# Patient Record
Sex: MALE | Race: WHITE | NOT HISPANIC OR LATINO | Employment: OTHER | URBAN - METROPOLITAN AREA
[De-identification: names, ages, dates, MRNs, and addresses within clinical notes are randomized per-mention and may not be internally consistent; named-entity substitution may affect disease eponyms.]

---

## 2023-03-13 ENCOUNTER — TELEPHONE (OUTPATIENT)
Dept: PRIMARY CARE | Facility: CLINIC | Age: 78
End: 2023-03-13

## 2023-03-13 NOTE — TELEPHONE ENCOUNTER
Patient would like a refill on his symbicort, atorvastation, clopidogrel, ezetimibe, metropolol, vitamin D, and magnesium. He is currently in [place] and needs it sent to the [facility].

## 2023-03-16 DIAGNOSIS — E78.2 MIXED HYPERLIPIDEMIA: ICD-10-CM

## 2023-03-16 DIAGNOSIS — E83.42 HYPOMAGNESEMIA: ICD-10-CM

## 2023-03-16 DIAGNOSIS — J45.909 ASTHMA, UNSPECIFIED ASTHMA SEVERITY, UNSPECIFIED WHETHER COMPLICATED, UNSPECIFIED WHETHER PERSISTENT (HHS-HCC): ICD-10-CM

## 2023-03-16 DIAGNOSIS — E55.9 HYPOVITAMINOSIS D: ICD-10-CM

## 2023-03-16 DIAGNOSIS — J30.89 SEASONAL ALLERGIC RHINITIS DUE TO OTHER ALLERGIC TRIGGER: ICD-10-CM

## 2023-03-16 DIAGNOSIS — I25.10 CORONARY ARTERY DISEASE, UNSPECIFIED VESSEL OR LESION TYPE, UNSPECIFIED WHETHER ANGINA PRESENT, UNSPECIFIED WHETHER NATIVE OR TRANSPLANTED HEART: Primary | ICD-10-CM

## 2023-03-16 PROBLEM — E78.5 HYPERLIPIDEMIA: Status: ACTIVE | Noted: 2023-03-16

## 2023-03-16 PROBLEM — Z85.46 HISTORY OF PROSTATE CANCER: Status: ACTIVE | Noted: 2023-03-16

## 2023-03-16 PROBLEM — I10 BENIGN ESSENTIAL HYPERTENSION: Status: ACTIVE | Noted: 2023-03-16

## 2023-03-16 PROBLEM — K40.90 INGUINAL HERNIA: Status: ACTIVE | Noted: 2023-03-16

## 2023-03-16 PROBLEM — Z98.61 S/P PTCA (PERCUTANEOUS TRANSLUMINAL CORONARY ANGIOPLASTY): Status: ACTIVE | Noted: 2023-03-16

## 2023-03-16 PROBLEM — I63.9 CVA (CEREBRAL VASCULAR ACCIDENT) (MULTI): Status: ACTIVE | Noted: 2023-03-16

## 2023-03-16 PROBLEM — J30.9 ALLERGIC RHINITIS: Status: ACTIVE | Noted: 2023-03-16

## 2023-03-16 RX ORDER — ATORVASTATIN CALCIUM 80 MG/1
80 TABLET, FILM COATED ORAL DAILY
COMMUNITY
Start: 2022-08-18 | End: 2023-03-16 | Stop reason: SDUPTHER

## 2023-03-16 RX ORDER — MAGNESIUM 250 MG
1 TABLET ORAL DAILY
COMMUNITY
End: 2023-03-16 | Stop reason: SDUPTHER

## 2023-03-16 RX ORDER — EZETIMIBE 10 MG/1
10 TABLET ORAL NIGHTLY
Qty: 90 TABLET | Refills: 1 | Status: SHIPPED | OUTPATIENT
Start: 2023-03-16 | End: 2024-04-26

## 2023-03-16 RX ORDER — ATORVASTATIN CALCIUM 80 MG/1
80 TABLET, FILM COATED ORAL DAILY
Qty: 90 TABLET | Refills: 1 | Status: SHIPPED | OUTPATIENT
Start: 2023-03-16 | End: 2024-04-26

## 2023-03-16 RX ORDER — EZETIMIBE 10 MG/1
10 TABLET ORAL NIGHTLY
COMMUNITY
End: 2023-03-16 | Stop reason: SDUPTHER

## 2023-03-16 RX ORDER — IPRATROPIUM BROMIDE 21 UG/1
2 SPRAY, METERED NASAL 2 TIMES DAILY
COMMUNITY
Start: 2022-06-07 | End: 2024-05-01 | Stop reason: ALTCHOICE

## 2023-03-16 RX ORDER — ERGOCALCIFEROL 1.25 MG/1
1.25 CAPSULE ORAL
Qty: 12 CAPSULE | Refills: 0 | Status: SHIPPED | OUTPATIENT
Start: 2023-03-16

## 2023-03-16 RX ORDER — MONTELUKAST SODIUM 10 MG/1
10 TABLET ORAL NIGHTLY
COMMUNITY

## 2023-03-16 RX ORDER — CLOPIDOGREL BISULFATE 75 MG/1
1 TABLET ORAL EVERY OTHER DAY
COMMUNITY
Start: 2022-06-09 | End: 2023-03-16 | Stop reason: SDUPTHER

## 2023-03-16 RX ORDER — METOPROLOL SUCCINATE 25 MG/1
25 TABLET, EXTENDED RELEASE ORAL DAILY
COMMUNITY
End: 2023-03-16 | Stop reason: SDUPTHER

## 2023-03-16 RX ORDER — LORATADINE 10 MG/1
1 TABLET ORAL DAILY
COMMUNITY
Start: 2022-08-16

## 2023-03-16 RX ORDER — VALSARTAN AND HYDROCHLOROTHIAZIDE 160; 12.5 MG/1; MG/1
1 TABLET, FILM COATED ORAL DAILY
COMMUNITY
Start: 2022-08-18

## 2023-03-16 RX ORDER — MAGNESIUM 250 MG
1 TABLET ORAL 2 TIMES DAILY
COMMUNITY
Start: 2022-10-05 | End: 2023-03-16 | Stop reason: SDUPTHER

## 2023-03-16 RX ORDER — FLUTICASONE PROPIONATE 50 MCG
2 SPRAY, SUSPENSION (ML) NASAL
COMMUNITY
Start: 2022-08-16 | End: 2024-05-01 | Stop reason: ALTCHOICE

## 2023-03-16 RX ORDER — NITROGLYCERIN 0.4 MG/1
0.4 TABLET SUBLINGUAL EVERY 5 MIN PRN
COMMUNITY

## 2023-03-16 RX ORDER — ERGOCALCIFEROL 1.25 MG/1
1.25 CAPSULE ORAL 2 TIMES WEEKLY
COMMUNITY
Start: 2022-10-17 | End: 2023-03-16 | Stop reason: SDUPTHER

## 2023-03-16 RX ORDER — CLOPIDOGREL BISULFATE 75 MG/1
75 TABLET ORAL EVERY OTHER DAY
Qty: 90 TABLET | Refills: 1 | Status: SHIPPED | OUTPATIENT
Start: 2023-03-16

## 2023-03-16 RX ORDER — ASPIRIN 81 MG/1
1 TABLET ORAL DAILY
COMMUNITY

## 2023-03-16 RX ORDER — BUDESONIDE AND FORMOTEROL FUMARATE DIHYDRATE 160; 4.5 UG/1; UG/1
2 AEROSOL RESPIRATORY (INHALATION) 2 TIMES DAILY
COMMUNITY
End: 2023-03-16 | Stop reason: SDUPTHER

## 2023-03-16 RX ORDER — MAGNESIUM 250 MG
1 TABLET ORAL 2 TIMES DAILY
Qty: 180 TABLET | Refills: 1 | Status: SHIPPED | OUTPATIENT
Start: 2023-03-16

## 2023-03-16 RX ORDER — ALBUTEROL SULFATE 90 UG/1
2 AEROSOL, METERED RESPIRATORY (INHALATION) 4 TIMES DAILY PRN
COMMUNITY
Start: 2022-05-25 | End: 2023-04-25 | Stop reason: SDUPTHER

## 2023-03-16 RX ORDER — METOPROLOL SUCCINATE 25 MG/1
25 TABLET, EXTENDED RELEASE ORAL DAILY
Qty: 90 TABLET | Refills: 1 | Status: SHIPPED | OUTPATIENT
Start: 2023-03-16 | End: 2024-05-01 | Stop reason: ALTCHOICE

## 2023-03-16 RX ORDER — BUDESONIDE AND FORMOTEROL FUMARATE DIHYDRATE 160; 4.5 UG/1; UG/1
2 AEROSOL RESPIRATORY (INHALATION) 2 TIMES DAILY
Qty: 10.2 G | Refills: 3 | Status: SHIPPED | OUTPATIENT
Start: 2023-03-16

## 2023-03-16 RX ORDER — OMEPRAZOLE 40 MG/1
40 CAPSULE, DELAYED RELEASE ORAL
COMMUNITY
Start: 2022-09-01

## 2023-04-25 ENCOUNTER — TELEPHONE (OUTPATIENT)
Dept: PRIMARY CARE | Facility: CLINIC | Age: 78
End: 2023-04-25

## 2023-04-25 DIAGNOSIS — J45.20 MILD INTERMITTENT ASTHMA, UNSPECIFIED WHETHER COMPLICATED (HHS-HCC): ICD-10-CM

## 2023-04-25 RX ORDER — ALBUTEROL SULFATE 90 UG/1
2 AEROSOL, METERED RESPIRATORY (INHALATION) 4 TIMES DAILY PRN
Qty: 18 G | Refills: 1 | Status: SHIPPED | OUTPATIENT
Start: 2023-04-25

## 2023-04-25 NOTE — TELEPHONE ENCOUNTER
Rx Refill Request Telephone Encounter    Name:  Lang Powell  :  [id number]  Medication Name:  albuterol inhaler  Dose : 90MCG  Route : inhalation  Frequency : as needed  Quantity : 1  Directions: inhale 2 puffs 4 times a day as needed for wheezing or shortness of breath   Specific Pharmacy location:  [facility]  Date of last appointment: 2022  Date of next appointment:  N/A  Best number to reach patient:  [phone]

## 2023-04-27 NOTE — TELEPHONE ENCOUNTER
Disregard this refill, patient called today and stated he asked for the wrong inhaler. He actually needs his symbicort to be refilled instead.

## 2024-02-08 PROBLEM — G45.9 TIA (TRANSIENT ISCHEMIC ATTACK): Status: ACTIVE | Noted: 2024-02-08

## 2024-02-08 PROBLEM — R53.83 FATIGUE: Status: ACTIVE | Noted: 2024-02-08

## 2024-04-26 DIAGNOSIS — E78.2 MIXED HYPERLIPIDEMIA: ICD-10-CM

## 2024-04-26 RX ORDER — EZETIMIBE 10 MG/1
10 TABLET ORAL NIGHTLY
Qty: 90 TABLET | Refills: 3 | Status: SHIPPED | OUTPATIENT
Start: 2024-04-26 | End: 2025-04-26

## 2024-04-26 RX ORDER — ATORVASTATIN CALCIUM 80 MG/1
80 TABLET, FILM COATED ORAL NIGHTLY
Qty: 90 TABLET | Refills: 3 | Status: SHIPPED | OUTPATIENT
Start: 2024-04-26 | End: 2025-04-26

## 2024-05-01 ENCOUNTER — OFFICE VISIT (OUTPATIENT)
Dept: CARDIOLOGY | Facility: CLINIC | Age: 79
End: 2024-05-01
Payer: MEDICARE

## 2024-05-01 VITALS
HEART RATE: 68 BPM | HEIGHT: 66 IN | WEIGHT: 180 LBS | BODY MASS INDEX: 28.93 KG/M2 | DIASTOLIC BLOOD PRESSURE: 70 MMHG | SYSTOLIC BLOOD PRESSURE: 130 MMHG

## 2024-05-01 DIAGNOSIS — I25.10 CORONARY ARTERY DISEASE INVOLVING NATIVE CORONARY ARTERY OF NATIVE HEART WITHOUT ANGINA PECTORIS: Primary | ICD-10-CM

## 2024-05-01 DIAGNOSIS — Z98.61 S/P PTCA (PERCUTANEOUS TRANSLUMINAL CORONARY ANGIOPLASTY): ICD-10-CM

## 2024-05-01 DIAGNOSIS — E66.3 OVERWEIGHT: ICD-10-CM

## 2024-05-01 DIAGNOSIS — I10 BENIGN ESSENTIAL HYPERTENSION: ICD-10-CM

## 2024-05-01 DIAGNOSIS — C61 PROSTATE CANCER METASTATIC TO INTRAABDOMINAL LYMPH NODE (MULTI): ICD-10-CM

## 2024-05-01 DIAGNOSIS — E78.2 MIXED HYPERLIPIDEMIA: ICD-10-CM

## 2024-05-01 DIAGNOSIS — C77.2 PROSTATE CANCER METASTATIC TO INTRAABDOMINAL LYMPH NODE (MULTI): ICD-10-CM

## 2024-05-01 PROBLEM — G45.9 TIA (TRANSIENT ISCHEMIC ATTACK): Status: RESOLVED | Noted: 2024-02-08 | Resolved: 2024-05-01

## 2024-05-01 PROCEDURE — 1036F TOBACCO NON-USER: CPT | Performed by: INTERNAL MEDICINE

## 2024-05-01 PROCEDURE — 3078F DIAST BP <80 MM HG: CPT | Performed by: INTERNAL MEDICINE

## 2024-05-01 PROCEDURE — 3075F SYST BP GE 130 - 139MM HG: CPT | Performed by: INTERNAL MEDICINE

## 2024-05-01 PROCEDURE — 1159F MED LIST DOCD IN RCRD: CPT | Performed by: INTERNAL MEDICINE

## 2024-05-01 PROCEDURE — 99214 OFFICE O/P EST MOD 30 MIN: CPT | Performed by: INTERNAL MEDICINE

## 2024-05-01 NOTE — LETTER
May 1, 2024     TERRY Morris  Office Address Unavailable  As Of 6/1/2023    Patient: Lang Powell   YOB: 1945   Date of Visit: 5/1/2024       Dear CHAD Zarate-CNP:    Thank you for referring Lang Powell to me for evaluation. Below are my notes for this consultation.  If you have questions, please do not hesitate to call me. I look forward to following your patient along with you.       Sincerely,     Jarvis Mccoy MD      CC: No Recipients  ______________________________________________________________________________________    Subjective   Lang Powell is a 78 y.o. male       Chief Complaint    Annual Exam          HPI   Patient is in the office for follow-up for coronary disease and previous PCI as noted below, in the interim he developed metastatic prostate cancer requiring chemotherapy, apparently the cancer spread to his lymph nodes in the abdomen but not to the bone.  He follows with oncology.  PSA down to less than 3 right now.  He feels great and denies any complaints at this time.  He has not had any lipid profile recently and we ordered 1.  Other labs were reviewed and discussed with the patient and they seem to be stable.  His weight is above target and was reminded to bring his weight further down.  He maintains active lifestyle.  He denies any angina palpitations orthopnea PND or lower extremity edema and his physical examination is only remarkable for overweight.    Assessment/recommendations:     1-single-vessel coronary disease status post angioplasty with drug-eluting stent to mid anterior descending artery. He will stay on aspirin and Plavix  He is compliant with measures to prevent recurrent CAD.  2-history of non-ST elevation myocardial infarction with preserved ejection fraction, this took place in June 2020. It has resolved with no consequences. Presently guideline directed medical therapy for chronic ischemic heart disease is in  "place  3-hypertension presently under control,   4-hyperlipidemia on high intensity statin with Lipitor 80 mg daily. Lipid profile is ordered,  5-significant overweight, with lifestyle modifications his weight is coming down nicely.  6-metastatic prostate cancer completed chemotherapy recently and presently in remission followed by oncology  7-intermittent asthma on medical therapy currently under control.  Review of Systems   All other systems reviewed and are negative.           Vitals:    05/01/24 0904   BP: 130/70   BP Location: Left arm   Patient Position: Sitting   Pulse: 68   Weight: 81.6 kg (180 lb)   Height: 1.676 m (5' 6\")        Objective   Physical Exam  Constitutional:       Appearance: Normal appearance.   HENT:      Nose: Nose normal.   Neck:      Vascular: No carotid bruit.   Cardiovascular:      Rate and Rhythm: Normal rate.      Pulses: Normal pulses.      Heart sounds: Normal heart sounds.   Pulmonary:      Effort: Pulmonary effort is normal.   Abdominal:      General: Bowel sounds are normal.      Palpations: Abdomen is soft.   Musculoskeletal:         General: Normal range of motion.      Cervical back: Normal range of motion.      Right lower leg: No edema.      Left lower leg: No edema.   Skin:     General: Skin is warm and dry.   Neurological:      General: No focal deficit present.      Mental Status: He is alert.   Psychiatric:         Mood and Affect: Mood normal.         Behavior: Behavior normal.         Thought Content: Thought content normal.         Judgment: Judgment normal.         Allergies  Ace inhibitors, Penicillins, and Sulfamethoxazole     Current Medications    Current Outpatient Medications:   •  albuterol 90 mcg/actuation inhaler, Inhale 2 puffs 4 times a day as needed for wheezing or shortness of breath., Disp: 18 g, Rfl: 1  •  aspirin 81 mg EC tablet, Take 1 tablet (81 mg) by mouth once daily., Disp: , Rfl:   •  atorvastatin (Lipitor) 80 mg tablet, Take 1 tablet (80 mg) " by mouth once daily at bedtime., Disp: 90 tablet, Rfl: 3  •  budesonide-formoteroL (Symbicort) 160-4.5 mcg/actuation inhaler, Inhale 2 puffs  in the morning and 2 puffs before bedtime., Disp: 10.2 g, Rfl: 3  •  clopidogrel (Plavix) 75 mg tablet, Take 1 tablet (75 mg) by mouth every other day., Disp: 90 tablet, Rfl: 1  •  darolutamide (Nubeqa) 300 mg tablet, TAKE 2 (600 MG) TABLETS ORALLY TWICE DAILY, Disp: 120 tablet, Rfl: 2  •  ergocalciferol (Vitamin D-2) 1.25 MG (61472 UT) capsule, Take 1 capsule (1,250 mcg) by mouth 1 (one) time per week., Disp: 12 capsule, Rfl: 0  •  ezetimibe (Zetia) 10 mg tablet, Take 1 tablet (10 mg) by mouth once daily at bedtime., Disp: 90 tablet, Rfl: 3  •  loratadine (Claritin) 10 mg tablet, Take 1 tablet (10 mg) by mouth once daily., Disp: , Rfl:   •  magnesium 250 mg tablet, Take 1 tablet (250 mg) by mouth in the morning and 1 tablet (250 mg) before bedtime., Disp: 180 tablet, Rfl: 1  •  montelukast (Singulair) 10 mg tablet, Take 1 tablet (10 mg) by mouth once daily at bedtime., Disp: , Rfl:   •  nitroglycerin (Nitrostat) 0.4 mg SL tablet, Place 1 tablet (0.4 mg) under the tongue every 5 minutes if needed for chest pain., Disp: , Rfl:   •  omeprazole (PriLOSEC) 40 mg DR capsule, Take 1 capsule (40 mg) by mouth once daily in the morning. Take before meals., Disp: , Rfl:   •  valsartan-hydrochlorothiazide (Diovan-HCT) 160-12.5 mg tablet, Take 1 tablet by mouth once daily., Disp: , Rfl:                      Assessment/Plan   1. Coronary artery disease involving native coronary artery of native heart without angina pectoris  Lipid Panel    Follow Up In Cardiology    Lipid Panel      2. Benign essential hypertension        3. Mixed hyperlipidemia  Lipid Panel    Lipid Panel      4. S/P PTCA (percutaneous transluminal coronary angioplasty)        5. BMI 29.0-29.9,adult        6. 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 Overweight                 Scribe  Attestation  By signing my name below, I, Michelle López LPN   attest that this documentation has been prepared under the direction and in the presence of Jarvis Mccoy MD.     Provider Attestation - Scribe documentation    All medical record entries made by the Scribe were at my direction and personally dictated by me. I have reviewed the chart and agree that the record accurately reflects my personal performance of the history, physical exam, discussion and plan.

## 2024-05-01 NOTE — PATIENT INSTRUCTIONS
Please bring all medicines, vitamins, and herbal supplements with you when you come to the office.    Prescriptions will not be filled unless you are compliant with your follow up appointments or have a follow up appointment scheduled as per instruction of your physician. Refills should be requested at the time of your visit.     BMI was above normal measurement. Current weight: 81.6 kg (180 lb)  Weight change since last visit (-) denotes wt loss 2 lbs   Weight loss needed to achieve BMI 25: 25.4 Lbs  Weight loss needed to achieve BMI 30: -5.5 Lbs  Provided instructions on dietary changes  Provided instructions on exercise.

## 2024-05-01 NOTE — PROGRESS NOTES
Subjective   Lang Powell is a 78 y.o. male       Chief Complaint    Annual Exam          HPI   Patient is in the office for follow-up for coronary disease and previous PCI as noted below, in the interim he developed metastatic prostate cancer requiring chemotherapy, apparently the cancer spread to his lymph nodes in the abdomen but not to the bone.  He follows with oncology.  PSA down to less than 3 right now.  He feels great and denies any complaints at this time.  He has not had any lipid profile recently and we ordered 1.  Other labs were reviewed and discussed with the patient and they seem to be stable.  His weight is above target and was reminded to bring his weight further down.  He maintains active lifestyle.  He denies any angina palpitations orthopnea PND or lower extremity edema and his physical examination is only remarkable for overweight.    Assessment/recommendations:     1-single-vessel coronary disease status post angioplasty with drug-eluting stent to mid anterior descending artery. He will stay on aspirin and Plavix  He is compliant with measures to prevent recurrent CAD.  2-history of non-ST elevation myocardial infarction with preserved ejection fraction, this took place in June 2020. It has resolved with no consequences. Presently guideline directed medical therapy for chronic ischemic heart disease is in place  3-hypertension presently under control,   4-hyperlipidemia on high intensity statin with Lipitor 80 mg daily. Lipid profile is ordered,  5-significant overweight, with lifestyle modifications his weight is coming down nicely.  6-metastatic prostate cancer completed chemotherapy recently and presently in remission followed by oncology  7-intermittent asthma on medical therapy currently under control.  Review of Systems   All other systems reviewed and are negative.           Vitals:    05/01/24 0904   BP: 130/70   BP Location: Left arm   Patient Position: Sitting   Pulse: 68   Weight:  "81.6 kg (180 lb)   Height: 1.676 m (5' 6\")        Objective   Physical Exam  Constitutional:       Appearance: Normal appearance.   HENT:      Nose: Nose normal.   Neck:      Vascular: No carotid bruit.   Cardiovascular:      Rate and Rhythm: Normal rate.      Pulses: Normal pulses.      Heart sounds: Normal heart sounds.   Pulmonary:      Effort: Pulmonary effort is normal.   Abdominal:      General: Bowel sounds are normal.      Palpations: Abdomen is soft.   Musculoskeletal:         General: Normal range of motion.      Cervical back: Normal range of motion.      Right lower leg: No edema.      Left lower leg: No edema.   Skin:     General: Skin is warm and dry.   Neurological:      General: No focal deficit present.      Mental Status: He is alert.   Psychiatric:         Mood and Affect: Mood normal.         Behavior: Behavior normal.         Thought Content: Thought content normal.         Judgment: Judgment normal.         Allergies  Ace inhibitors, Penicillins, and Sulfamethoxazole     Current Medications    Current Outpatient Medications:     albuterol 90 mcg/actuation inhaler, Inhale 2 puffs 4 times a day as needed for wheezing or shortness of breath., Disp: 18 g, Rfl: 1    aspirin 81 mg EC tablet, Take 1 tablet (81 mg) by mouth once daily., Disp: , Rfl:     atorvastatin (Lipitor) 80 mg tablet, Take 1 tablet (80 mg) by mouth once daily at bedtime., Disp: 90 tablet, Rfl: 3    budesonide-formoteroL (Symbicort) 160-4.5 mcg/actuation inhaler, Inhale 2 puffs  in the morning and 2 puffs before bedtime., Disp: 10.2 g, Rfl: 3    clopidogrel (Plavix) 75 mg tablet, Take 1 tablet (75 mg) by mouth every other day., Disp: 90 tablet, Rfl: 1    darolutamide (Nubeqa) 300 mg tablet, TAKE 2 (600 MG) TABLETS ORALLY TWICE DAILY, Disp: 120 tablet, Rfl: 2    ergocalciferol (Vitamin D-2) 1.25 MG (03907 UT) capsule, Take 1 capsule (1,250 mcg) by mouth 1 (one) time per week., Disp: 12 capsule, Rfl: 0    ezetimibe (Zetia) 10 mg " tablet, Take 1 tablet (10 mg) by mouth once daily at bedtime., Disp: 90 tablet, Rfl: 3    loratadine (Claritin) 10 mg tablet, Take 1 tablet (10 mg) by mouth once daily., Disp: , Rfl:     magnesium 250 mg tablet, Take 1 tablet (250 mg) by mouth in the morning and 1 tablet (250 mg) before bedtime., Disp: 180 tablet, Rfl: 1    montelukast (Singulair) 10 mg tablet, Take 1 tablet (10 mg) by mouth once daily at bedtime., Disp: , Rfl:     nitroglycerin (Nitrostat) 0.4 mg SL tablet, Place 1 tablet (0.4 mg) under the tongue every 5 minutes if needed for chest pain., Disp: , Rfl:     omeprazole (PriLOSEC) 40 mg DR capsule, Take 1 capsule (40 mg) by mouth once daily in the morning. Take before meals., Disp: , Rfl:     valsartan-hydrochlorothiazide (Diovan-HCT) 160-12.5 mg tablet, Take 1 tablet by mouth once daily., Disp: , Rfl:                      Assessment/Plan   1. Coronary artery disease involving native coronary artery of native heart without angina pectoris  Lipid Panel    Follow Up In Cardiology    Lipid Panel      2. Benign essential hypertension        3. Mixed hyperlipidemia  Lipid Panel    Lipid Panel      4. S/P PTCA (percutaneous transluminal coronary angioplasty)        5. BMI 29.0-29.9,adult        6. 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 Overweight                 Scribe Attestation  By signing my name below, IJessica LPN, Scribe   attest that this documentation has been prepared under the direction and in the presence of Jarvis Mccoy MD.     Provider Attestation - Scribe documentation    All medical record entries made by the Scribe were at my direction and personally dictated by me. I have reviewed the chart and agree that the record accurately reflects my personal performance of the history, physical exam, discussion and plan.

## 2024-05-02 ENCOUNTER — TELEPHONE (OUTPATIENT)
Dept: PRIMARY CARE | Facility: CLINIC | Age: 79
End: 2024-05-02
Payer: MEDICARE

## 2024-05-02 NOTE — TELEPHONE ENCOUNTER
Spoke with patient as a follow up to recent patient notes.    Patient has established with a new PCP through [facility] in [place].  He is seeing Vishnu Ceja.

## 2024-07-09 LAB
NON-UH HIE CHOL/HDL RATIO: 2.4
NON-UH HIE CHOLESTEROL: 172 MG/DL (ref 140–200)
NON-UH HIE HDL CHOLESTEROL: 72 MG/DL (ref 23–92)
NON-UH HIE LDL CHOLESTEROL,CALCULATED: 75 MG/DL (ref 0–100)
NON-UH HIE TRIGLYCERIDE W/REFLEX: 123 MG/DL (ref 0–149)
NON-UH HIE VLDL CHOLESTEROL: 24 MG/DL

## 2024-09-09 DIAGNOSIS — I10 BENIGN ESSENTIAL HYPERTENSION: ICD-10-CM

## 2024-09-12 RX ORDER — VALSARTAN AND HYDROCHLOROTHIAZIDE 160; 12.5 MG/1; MG/1
1 TABLET, FILM COATED ORAL DAILY
Qty: 90 TABLET | Refills: 3 | Status: SHIPPED | OUTPATIENT
Start: 2024-09-12 | End: 2025-09-12

## 2024-11-20 ENCOUNTER — APPOINTMENT (OUTPATIENT)
Dept: CARDIOLOGY | Facility: CLINIC | Age: 79
End: 2024-11-20
Payer: MEDICARE

## 2025-05-01 ENCOUNTER — PATIENT OUTREACH (OUTPATIENT)
Dept: HEMATOLOGY/ONCOLOGY | Facility: CLINIC | Age: 80
End: 2025-05-01
Payer: MEDICARE

## 2025-05-01 NOTE — PROGRESS NOTES
79 yr old male referred by Dr. Tristin Pearce of [facility]. Records scanned in [employer]. Recent PET revealed:  1.  Abnormal activity is seen involving soft tissue thickening involving the right iliac region.    Underlying metastatic disease cannot BE excluded.    2.  Avid nodule right adrenal gland.  Metastatic disease is suspected.    3.  Abnormal activity is seen involving the L4 vertebral body and right acetabulum suggestive of   bony metastatic disease.     Patient offered sooner appointment but declined as he is leaving on vacation and will not be returning until 5/17/25.

## 2025-05-20 ENCOUNTER — APPOINTMENT (OUTPATIENT)
Dept: HEMATOLOGY/ONCOLOGY | Facility: HOSPITAL | Age: 80
End: 2025-05-20
Payer: MEDICARE

## 2025-05-23 ENCOUNTER — LAB (OUTPATIENT)
Dept: LAB | Facility: HOSPITAL | Age: 80
End: 2025-05-23
Payer: MEDICARE

## 2025-05-23 ENCOUNTER — OFFICE VISIT (OUTPATIENT)
Dept: HEMATOLOGY/ONCOLOGY | Facility: HOSPITAL | Age: 80
End: 2025-05-23
Payer: MEDICARE

## 2025-05-23 VITALS
HEART RATE: 62 BPM | WEIGHT: 179.23 LBS | TEMPERATURE: 97.7 F | HEIGHT: 63 IN | RESPIRATION RATE: 20 BRPM | DIASTOLIC BLOOD PRESSURE: 68 MMHG | BODY MASS INDEX: 31.76 KG/M2 | SYSTOLIC BLOOD PRESSURE: 132 MMHG | OXYGEN SATURATION: 100 %

## 2025-05-23 DIAGNOSIS — C61 MALIGNANT NEOPLASM OF PROSTATE (MULTI): ICD-10-CM

## 2025-05-23 LAB
ALBUMIN SERPL BCP-MCNC: 4.5 G/DL (ref 3.4–5)
ALP SERPL-CCNC: 50 U/L (ref 33–136)
ALT SERPL W P-5'-P-CCNC: 23 U/L (ref 10–52)
ANION GAP SERPL CALC-SCNC: 14 MMOL/L (ref 10–20)
AST SERPL W P-5'-P-CCNC: 28 U/L (ref 9–39)
BASOPHILS # BLD AUTO: 0.03 X10*3/UL (ref 0–0.1)
BASOPHILS NFR BLD AUTO: 0.5 %
BILIRUB SERPL-MCNC: 0.9 MG/DL (ref 0–1.2)
BUN SERPL-MCNC: 17 MG/DL (ref 6–23)
CALCIUM SERPL-MCNC: 9.9 MG/DL (ref 8.6–10.3)
CHLORIDE SERPL-SCNC: 95 MMOL/L (ref 98–107)
CO2 SERPL-SCNC: 30 MMOL/L (ref 21–32)
COMMENTS - MP RESULT TYPE: NORMAL
CREAT SERPL-MCNC: 1.05 MG/DL (ref 0.5–1.3)
EGFRCR SERPLBLD CKD-EPI 2021: 72 ML/MIN/1.73M*2
EOSINOPHIL # BLD AUTO: 1.13 X10*3/UL (ref 0–0.4)
EOSINOPHIL NFR BLD AUTO: 19 %
ERYTHROCYTE [DISTWIDTH] IN BLOOD BY AUTOMATED COUNT: 12.7 % (ref 11.5–14.5)
GLUCOSE SERPL-MCNC: 102 MG/DL (ref 74–99)
HCT VFR BLD AUTO: 37.6 % (ref 41–52)
HGB BLD-MCNC: 12.7 G/DL (ref 13.5–17.5)
IMM GRANULOCYTES # BLD AUTO: 0.02 X10*3/UL (ref 0–0.5)
IMM GRANULOCYTES NFR BLD AUTO: 0.3 % (ref 0–0.9)
LYMPHOCYTES # BLD AUTO: 1.09 X10*3/UL (ref 0.8–3)
LYMPHOCYTES NFR BLD AUTO: 18.3 %
MCH RBC QN AUTO: 30.7 PG (ref 26–34)
MCHC RBC AUTO-ENTMCNC: 33.8 G/DL (ref 32–36)
MCV RBC AUTO: 91 FL (ref 80–100)
MONOCYTES # BLD AUTO: 0.51 X10*3/UL (ref 0.05–0.8)
MONOCYTES NFR BLD AUTO: 8.6 %
NEUTROPHILS # BLD AUTO: 3.17 X10*3/UL (ref 1.6–5.5)
NEUTROPHILS NFR BLD AUTO: 53.3 %
NRBC BLD-RTO: 0 /100 WBCS (ref 0–0)
PLATELET # BLD AUTO: 196 X10*3/UL (ref 150–450)
POTASSIUM SERPL-SCNC: 3.7 MMOL/L (ref 3.5–5.3)
PROT SERPL-MCNC: 6.8 G/DL (ref 6.4–8.2)
PSA SERPL-MCNC: 18.52 NG/ML
RBC # BLD AUTO: 4.14 X10*6/UL (ref 4.5–5.9)
SCAN RESULT: NORMAL
SODIUM SERPL-SCNC: 135 MMOL/L (ref 136–145)
WBC # BLD AUTO: 6 X10*3/UL (ref 4.4–11.3)

## 2025-05-23 PROCEDURE — 99204 OFFICE O/P NEW MOD 45 MIN: CPT | Performed by: STUDENT IN AN ORGANIZED HEALTH CARE EDUCATION/TRAINING PROGRAM

## 2025-05-23 PROCEDURE — 84402 ASSAY OF FREE TESTOSTERONE: CPT

## 2025-05-23 PROCEDURE — 36415 COLL VENOUS BLD VENIPUNCTURE: CPT

## 2025-05-23 PROCEDURE — 80053 COMPREHEN METABOLIC PANEL: CPT

## 2025-05-23 PROCEDURE — 84153 ASSAY OF PSA TOTAL: CPT

## 2025-05-23 PROCEDURE — 1036F TOBACCO NON-USER: CPT | Performed by: STUDENT IN AN ORGANIZED HEALTH CARE EDUCATION/TRAINING PROGRAM

## 2025-05-23 PROCEDURE — 3075F SYST BP GE 130 - 139MM HG: CPT | Performed by: STUDENT IN AN ORGANIZED HEALTH CARE EDUCATION/TRAINING PROGRAM

## 2025-05-23 PROCEDURE — 99214 OFFICE O/P EST MOD 30 MIN: CPT | Performed by: STUDENT IN AN ORGANIZED HEALTH CARE EDUCATION/TRAINING PROGRAM

## 2025-05-23 PROCEDURE — 1126F AMNT PAIN NOTED NONE PRSNT: CPT | Performed by: STUDENT IN AN ORGANIZED HEALTH CARE EDUCATION/TRAINING PROGRAM

## 2025-05-23 PROCEDURE — 3078F DIAST BP <80 MM HG: CPT | Performed by: STUDENT IN AN ORGANIZED HEALTH CARE EDUCATION/TRAINING PROGRAM

## 2025-05-23 PROCEDURE — 85025 COMPLETE CBC W/AUTO DIFF WBC: CPT

## 2025-05-23 RX ORDER — PREDNISONE 20 MG/1
1 TABLET ORAL
COMMUNITY
Start: 2025-04-21

## 2025-05-23 RX ORDER — SILDENAFIL 50 MG/1
50 TABLET, FILM COATED ORAL AS NEEDED
COMMUNITY
Start: 2024-01-26

## 2025-05-23 RX ORDER — ALPRAZOLAM 0.5 MG/1
1 TABLET ORAL NIGHTLY PRN
COMMUNITY

## 2025-05-23 RX ORDER — ZOLPIDEM TARTRATE 5 MG/1
5 TABLET ORAL NIGHTLY PRN
COMMUNITY
Start: 2023-06-07

## 2025-05-23 ASSESSMENT — ENCOUNTER SYMPTOMS
OCCASIONAL FEELINGS OF UNSTEADINESS: 0
DEPRESSION: 0
LOSS OF SENSATION IN FEET: 0

## 2025-05-23 ASSESSMENT — PATIENT HEALTH QUESTIONNAIRE - PHQ9
2. FEELING DOWN, DEPRESSED OR HOPELESS: NOT AT ALL
SUM OF ALL RESPONSES TO PHQ9 QUESTIONS 1 AND 2: 0
1. LITTLE INTEREST OR PLEASURE IN DOING THINGS: NOT AT ALL

## 2025-05-23 ASSESSMENT — PAIN SCALES - GENERAL: PAINLEVEL_OUTOF10: 0-NO PAIN

## 2025-05-23 NOTE — PROGRESS NOTES
Patient ID: Lang Powell is a 80 y.o. male.  Diagnosis: mCRPC  Referral: Dr. Tristin Pearce of [facility].    HISTORY OF PRESENT ILLNESS:  79 yr old male referred by Dr. Tristin Pearce of [facility]. Records scanned in . Recent PET revealed:  1.  Abnormal activity is seen involving soft tissue thickening involving the right iliac region.    Underlying metastatic disease cannot BE excluded.    2.  Avid nodule right adrenal gland.  Metastatic disease is suspected.    3.  Abnormal activity is seen involving the L4 vertebral body and right acetabulum suggestive of   bony metastatic disease.        Per notes, patient is s/p prostatectomy and radiation in about 2015, PSA at diagnosis >1500. His PSA managed once a year in [place], PSA hovering around 0.03 or 0.04 for many years. Patient presented to ER with double vision and dizziness, and diagnosed with R sided CNVI palsy, during work up a CT scan of his abdomen, ordered by Dr. Ceja in 05/2023, showed large right bulky lymphadenopathy. MRI Brain 6/2023 negative for metastatic disease. Patient s/p PET on 7/21/23 showing suggested residual diease in prostate bed, avid lymphadenopathy and multifocal bony disease, s/p BMBx on 7/5/23 which confirmed metastatic prostate cancer. Patient started on ADT + Darolutamide Aug 2023, s/p taxotere x 6 cycles September 2023 through Dec 2023. Continued on Darolutamide + Lupron q6 month.    PSA  4/6/25 - 8.6   Oct 2024 PSA 0.97  Dec 2024 PSA 1.13  Jan 2025 PSA 1.8  March 2025 PSA 4.6  4/9/2025 PSA 8.620    Surgical History:  Lang has a past surgical history that includes Other surgical history (06/08/2022); Other surgical history (06/08/2022); Other surgical history (06/08/2022); Other surgical history (06/09/2022); and Prostate surgery.    Social History:  Lang reports that he has never smoked. He has never been exposed to tobacco smoke. He has never used smokeless tobacco.  "He reports current alcohol use. He reports that he does not use drugs.    Family History:  Family History[1]      OBJECTIVE:  VS / Pain:  /68 (BP Location: Right arm, Patient Position: Sitting, BP Cuff Size: Adult)   Pulse 62   Temp 36.5 °C (97.7 °F) (Temporal)   Resp 20   Ht (S) 1.608 m (5' 3.31\")   Wt 81.3 kg (179 lb 3.7 oz)   SpO2 100%   BMI 31.44 kg/m²   BSA: 1.91 meters squared    Performance Status:  ECOG Score: 1- Restricted in physically strenuous activity.  Carries out light duty.      Diagnostic Results   1.  Abnormal activity is seen involving soft tissue thickening involving the right iliac region.    Underlying metastatic disease cannot BE excluded.    2.  Avid nodule right adrenal gland.  Metastatic disease is suspected.    3.  Abnormal activity is seen involving the L4 vertebral body and right acetabulum suggestive of   bony metastatic disease.         Assessment/Plan   I personally saw patient and counselled all visit  on his diagnosis, yaneth history and coordination of his care.   This is a 80 y.o.  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 Recent scans (PSMA PET) with disease in soft tissue + bones.   We discussed the clinical significance of diagnosis, goals of care and treatment plan in detail. We reviewed systemic treatment options including pluvicto vs chemotherapy vs clinical trials. At this time, it would be helpful to get ctDNA to rule possible biomarker based therapies. If not, then pluvicto vs chemo would be reasonable options and he get those locally.     Thank you for the opportunity to be involved in the care of Lang Powell. Please do not hesitate to reach out with any questions. Thank you.   -------------------------------------------------------------------------------------------------------------------------------  Sumeet Isaac MD, Msc, [facility]  [facility] Family Chair in Cancer Research  Co-Leader Genitourinary () Disease " Team  Director of  Medical Oncology Research Program   [facility] [place]  Associate Professor of Medicine  [address], [address]  [place] [id number]  Phone: [phone]  [email or web address]             [1]   Family History  Problem Relation Name Age of Onset    Other (adopted) Mother

## 2025-05-28 LAB
TESTOSTERONE FREE (CHAN): 0.6 PG/ML (ref 30–135)
TESTOSTERONE,TOTAL,LC-MS/MS: 7 NG/DL (ref 250–1100)

## 2025-06-03 ENCOUNTER — TELEMEDICINE (OUTPATIENT)
Dept: HEMATOLOGY/ONCOLOGY | Facility: HOSPITAL | Age: 80
End: 2025-06-03
Payer: MEDICARE

## 2025-06-03 DIAGNOSIS — C61 MALIGNANT NEOPLASM OF PROSTATE (MULTI): ICD-10-CM

## 2025-06-03 PROCEDURE — 1160F RVW MEDS BY RX/DR IN RCRD: CPT | Performed by: STUDENT IN AN ORGANIZED HEALTH CARE EDUCATION/TRAINING PROGRAM

## 2025-06-03 PROCEDURE — 1159F MED LIST DOCD IN RCRD: CPT | Performed by: STUDENT IN AN ORGANIZED HEALTH CARE EDUCATION/TRAINING PROGRAM

## 2025-06-03 PROCEDURE — 99214 OFFICE O/P EST MOD 30 MIN: CPT | Performed by: STUDENT IN AN ORGANIZED HEALTH CARE EDUCATION/TRAINING PROGRAM

## 2025-06-03 NOTE — PROGRESS NOTES
Patient ID: Lang Powell is a 80 y.o. male.  Diagnosis: mCRPC  Referral: Dr. Tristin Pearce of [facility].    HISTORY OF PRESENT ILLNESS:  79 yr old male referred by Dr. Tristin Pearce of [facility]. Records scanned in [employer]. Recent PET revealed:  1.  Abnormal activity is seen involving soft tissue thickening involving the right iliac region.    Underlying metastatic disease cannot BE excluded.    2.  Avid nodule right adrenal gland.  Metastatic disease is suspected.    3.  Abnormal activity is seen involving the L4 vertebral body and right acetabulum suggestive of   bony metastatic disease.     --  Per notes, patient is s/p prostatectomy and radiation in about 2015, PSA at diagnosis >1500. His PSA managed once a year in [place], PSA hovering around 0.03 or 0.04 for many years. Patient presented to ER with double vision and dizziness, and diagnosed with R sided CNVI palsy, during work up a CT scan of his abdomen, ordered by Dr. Ceja in 05/2023, showed large right bulky lymphadenopathy. MRI Brain 6/2023 negative for metastatic disease. Patient s/p PET on 7/21/23 showing suggested residual diease in prostate bed, avid lymphadenopathy and multifocal bony disease, s/p BMBx on 7/5/23 which confirmed metastatic prostate cancer. Patient started on ADT + Darolutamide Aug 2023, s/p taxotere x 6 cycles September 2023 through Dec 2023. Continued on Darolutamide + Lupron q6 month.  --  PSA  5/23/25 - 18.5  4/6/25 - 8.6   Oct 2024 PSA 0.97  Dec 2024 PSA 1.13  Jan 2025 PSA 1.8  March 2025 PSA 4.6  4/9/2025 PSA 8.620      Surgical History:  Lang has a past surgical history that includes Other surgical history (06/08/2022); Other surgical history (06/08/2022); Other surgical history (06/08/2022); Other surgical history (06/09/2022); and Prostate surgery.    Social History:  Lang reports that he has never smoked. He has never been exposed to tobacco smoke. He has never used  smokeless tobacco. He reports current alcohol use. He reports that he does not use drugs.    Family History:  Family History[1]      OBJECTIVE:  VS / Pain:  There were no vitals taken for this visit.  BSA: There is no height or weight on file to calculate BSA.    Performance Status:  ECOG Score: 1- Restricted in physically strenuous activity.  Carries out light duty.      Diagnostic Results   1.  Abnormal activity is seen involving soft tissue thickening involving the right iliac region.    Underlying metastatic disease cannot BE excluded.    2.  Avid nodule right adrenal gland.  Metastatic disease is suspected.    3.  Abnormal activity is seen involving the L4 vertebral body and right acetabulum suggestive of   bony metastatic disease.         Assessment/Plan   I personally saw patient and counselled all visit  on his diagnosis, yaneth history and coordination of his care.   This is a 80 y.o.  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 Recent scans (PSMA PET) with disease in soft tissue + bones.   We discussed the clinical significance of diagnosis, goals of care and treatment plan in detail. We reviewed systemic treatment options including pluvicto vs chemotherapy vs clinical trials. ctDNA w/o targetable alterations.   Pt prefers to go with pluvicto.  Will get it closer home. See us afterwards.     This visit was conducted via phone. Patient did not have access to a camera for video visit. Spent 30  minutes on diagnosis, management, treatment plan and next steps.     Thank you for the opportunity to be involved in the care of Lang Powell. Please do not hesitate to reach out with any questions. Thank you.   -------------------------------------------------------------------------------------------------------------------------------  Sumeet Isaac MD, Msc, FACP  [facility] Chair in Cancer Research  Co-Leader Genitourinary () Disease Team  Director of [facility]  Oncology Research Program   [facility]  Associate Professor of Medicine  [address]  [place] [id number]  Phone: [phone]  [email or web address]             [1]   Family History  Problem Relation Name Age of Onset    Other (adopted) Mother

## 2025-06-09 DIAGNOSIS — E78.2 MIXED HYPERLIPIDEMIA: ICD-10-CM

## 2025-06-09 RX ORDER — ATORVASTATIN CALCIUM 80 MG/1
80 TABLET, FILM COATED ORAL NIGHTLY
Qty: 90 TABLET | Refills: 3 | Status: SHIPPED | OUTPATIENT
Start: 2025-06-09 | End: 2026-06-09

## 2025-06-20 DIAGNOSIS — E78.2 MIXED HYPERLIPIDEMIA: ICD-10-CM

## 2025-06-23 RX ORDER — EZETIMIBE 10 MG/1
10 TABLET ORAL NIGHTLY
Qty: 90 TABLET | Refills: 3 | Status: SHIPPED | OUTPATIENT
Start: 2025-06-23 | End: 2026-06-23

## 2025-07-01 ENCOUNTER — APPOINTMENT (OUTPATIENT)
Dept: CARDIOLOGY | Facility: CLINIC | Age: 80
End: 2025-07-01
Payer: MEDICARE

## 2025-07-31 ENCOUNTER — APPOINTMENT (OUTPATIENT)
Dept: CARDIOLOGY | Facility: CLINIC | Age: 80
End: 2025-07-31
Payer: MEDICARE

## 2025-09-04 DIAGNOSIS — I10 BENIGN ESSENTIAL HYPERTENSION: ICD-10-CM

## 2025-09-04 RX ORDER — VALSARTAN AND HYDROCHLOROTHIAZIDE 160; 12.5 MG/1; MG/1
1 TABLET, FILM COATED ORAL DAILY
Qty: 90 TABLET | Refills: 3 | Status: SHIPPED | OUTPATIENT
Start: 2025-09-04 | End: 2026-09-04

## 2025-09-23 ENCOUNTER — APPOINTMENT (OUTPATIENT)
Dept: CARDIOLOGY | Facility: CLINIC | Age: 80
End: 2025-09-23
Payer: MEDICARE